# Patient Record
Sex: FEMALE | Race: WHITE | NOT HISPANIC OR LATINO
[De-identification: names, ages, dates, MRNs, and addresses within clinical notes are randomized per-mention and may not be internally consistent; named-entity substitution may affect disease eponyms.]

---

## 2020-05-01 ENCOUNTER — RX ONLY (OUTPATIENT)
Age: 60
Setting detail: RX ONLY
End: 2020-05-01

## 2023-05-16 ENCOUNTER — APPOINTMENT (RX ONLY)
Dept: URBAN - METROPOLITAN AREA CLINIC 130 | Facility: CLINIC | Age: 63
Setting detail: DERMATOLOGY
End: 2023-05-16

## 2023-05-16 DIAGNOSIS — L82.1 OTHER SEBORRHEIC KERATOSIS: ICD-10-CM

## 2023-05-16 DIAGNOSIS — L30.8 OTHER SPECIFIED DERMATITIS: ICD-10-CM | Status: INADEQUATELY CONTROLLED

## 2023-05-16 PROCEDURE — ? PRESCRIPTION

## 2023-05-16 PROCEDURE — ? ADDITIONAL NOTES

## 2023-05-16 PROCEDURE — ? COUNSELING

## 2023-05-16 PROCEDURE — ? PRESCRIPTION MEDICATION MANAGEMENT

## 2023-05-16 PROCEDURE — 99204 OFFICE O/P NEW MOD 45 MIN: CPT

## 2023-05-16 RX ORDER — CLOBETASOL PROPIONATE 0.5 MG/G
CREAM TOPICAL BID
Qty: 60 | Refills: 3 | Status: ERX | COMMUNITY
Start: 2023-05-16

## 2023-05-16 RX ADMIN — CLOBETASOL PROPIONATE: 0.5 CREAM TOPICAL at 00:00

## 2023-05-16 ASSESSMENT — LOCATION ZONE DERM
LOCATION ZONE: HAND
LOCATION ZONE: TRUNK

## 2023-05-16 ASSESSMENT — LOCATION SIMPLE DESCRIPTION DERM
LOCATION SIMPLE: RIGHT HAND
LOCATION SIMPLE: LEFT UPPER BACK
LOCATION SIMPLE: LEFT HAND

## 2023-05-16 ASSESSMENT — LOCATION DETAILED DESCRIPTION DERM
LOCATION DETAILED: LEFT THENAR EMINENCE
LOCATION DETAILED: LEFT HYPOTHENAR EMINENCE
LOCATION DETAILED: LEFT SUPERIOR UPPER BACK
LOCATION DETAILED: RIGHT HYPOTHENAR EMINENCE
LOCATION DETAILED: LEFT RADIAL PALM
LOCATION DETAILED: RIGHT ULNAR PALM
LOCATION DETAILED: RIGHT THENAR EMINENCE

## 2023-05-16 NOTE — PROCEDURE: PRESCRIPTION MEDICATION MANAGEMENT
Continue Regimen: Clobetasol 0.05% cream Apply twice daily to affected areas on hands for 5 days per week as needed for flares
Render In Strict Bullet Format?: No
Detail Level: Zone

## 2023-05-16 NOTE — HPI: ECZEMA (PATIENT REPORTED)
Where Is Your Eczema Located?: Eczema
List Prescription Topical Steroids You Are Currently Using (Separate Each Name With A Comma):: Clobetasol
Additional Comments (Use Complete Sentences): The patient stated that she has had UV therapy done on the areas of concern in the past and that she purchased her own light to do this at home, but that it was not extremely effective. \\nShe stated that she does have a family history of psoriasis in her family (brother).

## 2023-05-16 NOTE — PROCEDURE: ADDITIONAL NOTES
Additional Notes: The patient stated that a biopsy of the affected areas was performed approximately 2-3 years ago. The records are being requested at todayâs visit. \\nDDx includes psoriasis given plaque-like morphology however no nail changes or other lesions.
Render Risk Assessment In Note?: no
Detail Level: Simple

## 2025-07-18 ENCOUNTER — NEW PATIENT (OUTPATIENT)
Age: 65
End: 2025-07-18

## 2025-07-18 DIAGNOSIS — H52.202: ICD-10-CM

## 2025-07-18 DIAGNOSIS — H52.4: ICD-10-CM

## 2025-07-18 DIAGNOSIS — H25.813: ICD-10-CM

## 2025-07-18 DIAGNOSIS — H52.13: ICD-10-CM

## 2025-07-18 PROCEDURE — 92004 COMPRE OPH EXAM NEW PT 1/>: CPT

## 2025-07-18 PROCEDURE — 92310-4 LEVEL 4 NEW SOFT LENS

## 2025-07-18 PROCEDURE — 92015 DETERMINE REFRACTIVE STATE: CPT
